# Patient Record
(demographics unavailable — no encounter records)

---

## 2024-12-19 NOTE — HISTORY OF PRESENT ILLNESS
[FreeTextEntry1] :  71yo  postmenopausal without HRT here for annual Gyn exam. No Gyn complaints. Pt with h/o osteopenia stopped Fosamax in 2012 after 5 years. Last BMD 2023 showed minimal osteopenia at femoral neck , normal at spine but osteoporosis at forearm(T Score -3.7). Dr. Che Cox is her PCP and did not feel treatment was necessary with plan to repeat BMD next year  OB History Total Pregnancies: 1, Full Term: 1, Livin.  X 1   [Mammogramdate] : 6/20/24 [TextBox_19] : [facility] BIRADS 1D [BreastSonogramDate] : 6/20/24 [TextBox_25] : [facility] BIRADS 1 [PapSmeardate] : 12/8/22 [TextBox_31] : Neg/HPV Neg [BoneDensityDate] : 06/2023 [TextBox_37] [facility] @ Fem Neck -1.5 Forearm - [ColonoscopyDate] : 10/31/24 [TextBox_43] : Negative 5 year recall

## 2024-12-19 NOTE — HISTORY OF PRESENT ILLNESS
[FreeTextEntry1] :  71yo  postmenopausal without HRT here for annual Gyn exam. No Gyn complaints. Pt with h/o osteopenia stopped Fosamax in 2012 after 5 years. Last BMD 2023 showed minimal osteopenia at femoral neck , normal at spine but osteoporosis at forearm(T Score -3.7). Dr. Che Cox is her PCP and did not feel treatment was necessary with plan to repeat BMD next year  OB History Total Pregnancies: 1, Full Term: 1, Livin.  X 1   [Mammogramdate] : 6/20/24 [TextBox_19] : [facility] BIRADS 1D [BreastSonogramDate] : 6/20/24 [TextBox_25] : [facility] BIRADS 1 [PapSmeardate] : 12/8/22 [TextBox_31] : Neg/HPV Neg [BoneDensityDate] : 06/2023 [TextBox_37] [facility]4 Hip @ Fem Neck -1.5 Forearm - [ColonoscopyDate] : 10/31/24 [TextBox_43] : Negative 5 year recall